# Patient Record
Sex: FEMALE | Race: WHITE | Employment: PART TIME | URBAN - METROPOLITAN AREA
[De-identification: names, ages, dates, MRNs, and addresses within clinical notes are randomized per-mention and may not be internally consistent; named-entity substitution may affect disease eponyms.]

---

## 2019-09-24 ENCOUNTER — HOSPITAL ENCOUNTER (EMERGENCY)
Age: 34
Discharge: HOME | End: 2019-09-24
Attending: EMERGENCY MEDICINE
Payer: COMMERCIAL

## 2019-09-24 ENCOUNTER — APPOINTMENT (OUTPATIENT)
Dept: GENERAL RADIOLOGY | Age: 34
End: 2019-09-24
Payer: COMMERCIAL

## 2019-09-24 VITALS
TEMPERATURE: 98.2 F | WEIGHT: 130 LBS | DIASTOLIC BLOOD PRESSURE: 85 MMHG | HEIGHT: 65 IN | BODY MASS INDEX: 21.66 KG/M2 | HEART RATE: 128 BPM | OXYGEN SATURATION: 99 % | RESPIRATION RATE: 20 BRPM | SYSTOLIC BLOOD PRESSURE: 128 MMHG

## 2019-09-24 DIAGNOSIS — M25.512 LEFT SHOULDER PAIN, UNSPECIFIED CHRONICITY: Primary | ICD-10-CM

## 2019-09-24 PROCEDURE — 6370000000 HC RX 637 (ALT 250 FOR IP): Performed by: EMERGENCY MEDICINE

## 2019-09-24 PROCEDURE — 99283 EMERGENCY DEPT VISIT LOW MDM: CPT

## 2019-09-24 PROCEDURE — 73030 X-RAY EXAM OF SHOULDER: CPT

## 2019-09-24 RX ORDER — ACETAMINOPHEN 325 MG/1
650 TABLET ORAL ONCE
Status: COMPLETED | OUTPATIENT
Start: 2019-09-24 | End: 2019-09-24

## 2019-09-24 RX ADMIN — ACETAMINOPHEN 650 MG: 325 TABLET ORAL at 01:19

## 2019-09-24 ASSESSMENT — PAIN SCALES - GENERAL
PAINLEVEL_OUTOF10: 7
PAINLEVEL_OUTOF10: 6

## 2019-09-24 ASSESSMENT — PAIN DESCRIPTION - ORIENTATION: ORIENTATION: LEFT

## 2019-09-24 ASSESSMENT — PAIN DESCRIPTION - LOCATION: LOCATION: SHOULDER

## 2019-09-24 ASSESSMENT — PAIN DESCRIPTION - PAIN TYPE: TYPE: ACUTE PAIN
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
## 2021-09-06 ENCOUNTER — HOSPITAL ENCOUNTER (EMERGENCY)
Age: 36
Discharge: HOME | End: 2021-09-06
Attending: STUDENT IN AN ORGANIZED HEALTH CARE EDUCATION/TRAINING PROGRAM
Payer: COMMERCIAL

## 2021-09-06 VITALS
HEIGHT: 65 IN | TEMPERATURE: 97.3 F | DIASTOLIC BLOOD PRESSURE: 99 MMHG | BODY MASS INDEX: 22.49 KG/M2 | RESPIRATION RATE: 18 BRPM | SYSTOLIC BLOOD PRESSURE: 162 MMHG | HEART RATE: 100 BPM | WEIGHT: 135 LBS | OXYGEN SATURATION: 98 %

## 2021-09-06 DIAGNOSIS — H66.90 ACUTE OTITIS MEDIA, UNSPECIFIED OTITIS MEDIA TYPE: ICD-10-CM

## 2021-09-06 DIAGNOSIS — J02.0 STREPTOCOCCAL SORE THROAT: Primary | ICD-10-CM

## 2021-09-06 LAB — STREP GRP A PCR: POSITIVE

## 2021-09-06 PROCEDURE — 6370000000 HC RX 637 (ALT 250 FOR IP): Performed by: PHYSICIAN ASSISTANT

## 2021-09-06 PROCEDURE — 87651 STREP A DNA AMP PROBE: CPT

## 2021-09-06 PROCEDURE — 99283 EMERGENCY DEPT VISIT LOW MDM: CPT

## 2021-09-06 RX ORDER — AZITHROMYCIN 500 MG/1
500 TABLET, FILM COATED ORAL DAILY
Qty: 5 TABLET | Refills: 0 | Status: SHIPPED | OUTPATIENT
Start: 2021-09-06 | End: 2021-09-06 | Stop reason: SDUPTHER

## 2021-09-06 RX ORDER — AZITHROMYCIN 500 MG/1
500 TABLET, FILM COATED ORAL DAILY
Qty: 5 TABLET | Refills: 0 | Status: SHIPPED | OUTPATIENT
Start: 2021-09-06 | End: 2021-09-11

## 2021-09-06 RX ORDER — AZITHROMYCIN 500 MG/1
500 TABLET, FILM COATED ORAL ONCE
Status: COMPLETED | OUTPATIENT
Start: 2021-09-06 | End: 2021-09-06

## 2021-09-06 RX ADMIN — AZITHROMYCIN MONOHYDRATE 500 MG: 500 TABLET ORAL at 09:16

## 2021-09-06 ASSESSMENT — ENCOUNTER SYMPTOMS
WHEEZING: 0
NAUSEA: 0
VOMITING: 0
CHEST TIGHTNESS: 0
CONSTIPATION: 0
SHORTNESS OF BREATH: 0
VOICE CHANGE: 0
TROUBLE SWALLOWING: 0
SINUS PRESSURE: 1
COUGH: 0
RHINORRHEA: 0
EYE DISCHARGE: 1
ABDOMINAL PAIN: 0
SORE THROAT: 1

## 2021-09-06 ASSESSMENT — PAIN DESCRIPTION - PROGRESSION: CLINICAL_PROGRESSION: GRADUALLY WORSENING

## 2021-09-06 ASSESSMENT — PAIN DESCRIPTION - DESCRIPTORS: DESCRIPTORS: ACHING

## 2021-09-06 ASSESSMENT — PAIN DESCRIPTION - FREQUENCY: FREQUENCY: INTERMITTENT

## 2021-09-06 ASSESSMENT — PAIN - FUNCTIONAL ASSESSMENT: PAIN_FUNCTIONAL_ASSESSMENT: ACTIVITIES ARE NOT PREVENTED

## 2021-09-06 ASSESSMENT — PAIN SCALES - GENERAL: PAINLEVEL_OUTOF10: 6

## 2021-09-06 ASSESSMENT — PAIN DESCRIPTION - PAIN TYPE: TYPE: ACUTE PAIN

## 2021-09-06 ASSESSMENT — PAIN DESCRIPTION - LOCATION: LOCATION: EAR

## 2021-09-06 ASSESSMENT — PAIN DESCRIPTION - ORIENTATION: ORIENTATION: LEFT;RIGHT

## 2021-09-06 ASSESSMENT — PAIN DESCRIPTION - ONSET: ONSET: ON-GOING

## 2021-09-06 ASSESSMENT — PAIN SCALES - WONG BAKER: WONGBAKER_NUMERICALRESPONSE: 2

## 2021-09-06 NOTE — ED PROVIDER NOTES
[facility] ED  eMERGENCY dEPARTMENT eNCOUnter      Pt Name: Stephanie Lopez  MRN: [id number]  [place] 1985  Date of evaluation: 9/6/2021  Provider: Conchita Rodrigues, [address]    Stephanie Lopez is a 39 y.o. female per chart review has no pertinent pmhx presents to the ED with c/o sudden onset, gradually worsening sore throat, congestion, and bilateral ear pain x4 days. No known sick contacts. Denies associated chest pain, shortness of breath, nausea/vomiting or decreased PO intake. REVIEW OF SYSTEMS       Review of Systems   Constitutional: Positive for fatigue. Negative for activity change, appetite change, chills, diaphoresis and fever. HENT: Positive for congestion, ear pain, sinus pressure and sore throat. Negative for ear discharge, postnasal drip, rhinorrhea, trouble swallowing and voice change. Eyes: Positive for discharge. Respiratory: Negative for cough, chest tightness, shortness of breath and wheezing. Cardiovascular: Negative for chest pain, palpitations and leg swelling. Gastrointestinal: Negative for abdominal pain, constipation, nausea and vomiting. Genitourinary: Negative for dysuria, frequency and urgency. Musculoskeletal: Negative for arthralgias and myalgias. Neurological: Negative for dizziness, syncope, weakness, light-headedness, numbness and headaches. All other systems reviewed and are negative. Except as noted above the remainder of the review of systems was reviewed and negative. PAST MEDICAL HISTORY   History reviewed. No pertinent past medical history. SURGICAL HISTORY     History reviewed. No pertinent surgical history. CURRENT MEDICATIONS       Previous Medications    PRENAT W/O A-FECBN-DSS-FA-DHA (CITRANATAL HARMONY) 28-1-250 MG CAPS    Take 1 tablet by mouth daily. ALLERGIES     Pcn [penicillins]    FAMILY HISTORY     History reviewed. No pertinent family history.        SOCIAL HISTORY canal normal.  No middle ear effusion. Tympanic membrane is not erythematous. Nose: Congestion and rhinorrhea present. Mouth/Throat:      Mouth: Mucous membranes are moist.      Pharynx: Oropharynx is clear. Uvula midline. Posterior oropharyngeal erythema present. No pharyngeal swelling, oropharyngeal exudate or uvula swelling. Comments: No pooling of secretions in the posterior oropharynx. Eyes:      Extraocular Movements: Extraocular movements intact. Pupils: Pupils are equal, round, and reactive to light. Cardiovascular:      Rate and Rhythm: Normal rate and regular rhythm. Pulses: Normal pulses. Heart sounds: Normal heart sounds. No murmur heard. Pulmonary:      Effort: Pulmonary effort is normal. No respiratory distress. Breath sounds: Normal breath sounds. No wheezing. Comments: Breathing comfortably on room air. Abdominal:      General: Abdomen is flat. Bowel sounds are normal. There is no distension. Palpations: Abdomen is soft. Tenderness: There is no abdominal tenderness. There is no guarding. Musculoskeletal:         General: No swelling, tenderness, deformity or signs of injury. Normal range of motion. Cervical back: Normal range of motion and neck supple. No rigidity. No muscular tenderness. Lymphadenopathy:      Cervical: No cervical adenopathy. Skin:     General: Skin is warm and dry. Capillary Refill: Capillary refill takes less than 2 seconds. Neurological:      General: No focal deficit present. Mental Status: She is alert and oriented to person, place, and time. Mental status is at baseline. Motor: No weakness. Gait: Gait normal.   Psychiatric:         Mood and Affect: Mood normal.         Behavior: Behavior normal.         Thought Content:  Thought content normal.         Judgment: Judgment normal.           LABS:  Labs Reviewed   RAPID STREP SCREEN - Abnormal; Notable for the following components:

## 2021-09-06 NOTE — ED TRIAGE NOTES
Pt reports that she is having bilat ear pain ans a sore throat for aprox 4 days pt alert rr even non labored speaking clear full sentences

## 2025-03-01 ENCOUNTER — HOSPITAL ENCOUNTER (EMERGENCY)
Age: 40
Discharge: HOME | DRG: 566 | End: 2025-03-01
Payer: COMMERCIAL

## 2025-03-01 VITALS
OXYGEN SATURATION: 100 % | DIASTOLIC BLOOD PRESSURE: 96 MMHG | SYSTOLIC BLOOD PRESSURE: 134 MMHG | BODY MASS INDEX: 24.76 KG/M2 | HEART RATE: 71 BPM | WEIGHT: 148.6 LBS | TEMPERATURE: 98.3 F | RESPIRATION RATE: 20 BRPM | HEIGHT: 65 IN

## 2025-03-01 DIAGNOSIS — Z34.90 PREGNANCY, UNSPECIFIED GESTATIONAL AGE: Primary | ICD-10-CM

## 2025-03-01 DIAGNOSIS — I10 ESSENTIAL HYPERTENSION: ICD-10-CM

## 2025-03-01 LAB
ALBUMIN SERPL-MCNC: 4.4 G/DL (ref 3.5–4.6)
ALP SERPL-CCNC: 62 U/L (ref 40–130)
ALT SERPL-CCNC: 13 U/L (ref 0–33)
ANION GAP SERPL CALCULATED.3IONS-SCNC: 12 MEQ/L (ref 9–15)
AST SERPL-CCNC: 13 U/L (ref 0–35)
BASOPHILS # BLD: 0 K/UL (ref 0–0.2)
BASOPHILS NFR BLD: 0.3 %
BILIRUB SERPL-MCNC: 0.9 MG/DL (ref 0.2–0.7)
BILIRUB UR QL STRIP: NEGATIVE
BUN SERPL-MCNC: 11 MG/DL (ref 6–20)
CALCIUM SERPL-MCNC: 9.7 MG/DL (ref 8.5–9.9)
CHLORIDE SERPL-SCNC: 98 MEQ/L (ref 95–107)
CHP ED QC CHECK: NORMAL
CLARITY UR: CLEAR
CO2 SERPL-SCNC: 24 MEQ/L (ref 20–31)
COLOR UR: YELLOW
CREAT SERPL-MCNC: 0.68 MG/DL (ref 0.5–0.9)
EOSINOPHIL # BLD: 0.1 K/UL (ref 0–0.7)
EOSINOPHIL NFR BLD: 0.9 %
ERYTHROCYTE [DISTWIDTH] IN BLOOD BY AUTOMATED COUNT: 14.4 % (ref 11.5–14.5)
GLOBULIN SER CALC-MCNC: 3.1 G/DL (ref 2.3–3.5)
GLUCOSE BLD-MCNC: 97 MG/DL
GLUCOSE BLD-MCNC: 97 MG/DL (ref 70–99)
GLUCOSE SERPL-MCNC: 93 MG/DL (ref 70–99)
GLUCOSE UR STRIP-MCNC: NEGATIVE MG/DL
HCG, URINE, POC: POSITIVE
HCT VFR BLD AUTO: 39 % (ref 37–47)
HGB BLD-MCNC: 14.1 G/DL (ref 12–16)
HGB UR QL STRIP: NEGATIVE
INFLUENZA A BY PCR: NEGATIVE
INFLUENZA B BY PCR: NEGATIVE
KETONES UR STRIP-MCNC: ABNORMAL MG/DL
LEUKOCYTE ESTERASE UR QL STRIP: NEGATIVE
LYMPHOCYTES # BLD: 3.1 K/UL (ref 1–4.8)
LYMPHOCYTES NFR BLD: 24.8 %
Lab: NORMAL
MCH RBC QN AUTO: 33.8 PG (ref 27–31.3)
MCHC RBC AUTO-ENTMCNC: 36.2 % (ref 33–37)
MCV RBC AUTO: 93.5 FL (ref 79.4–94.8)
MONOCYTES # BLD: 1 K/UL (ref 0.2–0.8)
MONOCYTES NFR BLD: 7.7 %
NEGATIVE QC PASS/FAIL: NORMAL
NEUTROPHILS # BLD: 8.2 K/UL (ref 1.4–6.5)
NEUTS SEG NFR BLD: 65.9 %
NITRITE UR QL STRIP: NEGATIVE
PERFORMED ON: NORMAL
PH UR STRIP: 5.5 [PH] (ref 5–9)
PLATELET # BLD AUTO: 402 K/UL (ref 130–400)
POSITIVE QC PASS/FAIL: NORMAL
POTASSIUM SERPL-SCNC: 4 MEQ/L (ref 3.4–4.9)
PROT SERPL-MCNC: 7.5 G/DL (ref 6.3–8)
PROT UR STRIP-MCNC: NEGATIVE MG/DL
RBC # BLD AUTO: 4.17 M/UL (ref 4.2–5.4)
SARS-COV-2 RDRP RESP QL NAA+PROBE: NOT DETECTED
SODIUM SERPL-SCNC: 134 MEQ/L (ref 135–144)
SP GR UR STRIP: 1.03 (ref 1–1.03)
URINE REFLEX TO CULTURE: ABNORMAL
UROBILINOGEN UR STRIP-ACNC: 0.2 E.U./DL
WBC # BLD AUTO: 12.5 K/UL (ref 4.8–10.8)

## 2025-03-01 PROCEDURE — 85025 COMPLETE CBC W/AUTO DIFF WBC: CPT

## 2025-03-01 PROCEDURE — 87502 INFLUENZA DNA AMP PROBE: CPT

## 2025-03-01 PROCEDURE — 81003 URINALYSIS AUTO W/O SCOPE: CPT

## 2025-03-01 PROCEDURE — 99283 EMERGENCY DEPT VISIT LOW MDM: CPT

## 2025-03-01 PROCEDURE — 87635 SARS-COV-2 COVID-19 AMP PRB: CPT

## 2025-03-01 PROCEDURE — 80053 COMPREHEN METABOLIC PANEL: CPT

## 2025-03-01 PROCEDURE — 36415 COLL VENOUS BLD VENIPUNCTURE: CPT

## 2025-03-01 ASSESSMENT — ENCOUNTER SYMPTOMS
COLOR CHANGE: 0
SORE THROAT: 0
DIARRHEA: 0
ABDOMINAL DISTENTION: 0
ABDOMINAL PAIN: 0
EYE DISCHARGE: 0
RHINORRHEA: 0
CONSTIPATION: 0
SHORTNESS OF BREATH: 0
RECTAL PAIN: 0

## 2025-03-01 ASSESSMENT — PAIN - FUNCTIONAL ASSESSMENT: PAIN_FUNCTIONAL_ASSESSMENT: NONE - DENIES PAIN

## 2025-03-01 ASSESSMENT — LIFESTYLE VARIABLES
HOW OFTEN DO YOU HAVE A DRINK CONTAINING ALCOHOL: NEVER
HOW MANY STANDARD DRINKS CONTAINING ALCOHOL DO YOU HAVE ON A TYPICAL DAY: PATIENT DOES NOT DRINK

## 2025-03-01 NOTE — ED PROVIDER NOTES
[facility] EMERGENCY DEPARTMENT  EMERGENCY DEPARTMENT ENCOUNTER      Pt Name: Emanuel Tamayo  MRN: [id number]  Birthdate 1985  Date of evaluation: 3/1/2025  Provider: Freddy Orourke PA-C  5:28 PM EST    CHIEF COMPLAINT       Chief Complaint   Patient presents with    Lightheadedness         HISTORY OF PRESENT ILLNESS   (Location/Symptom, Timing/Onset, Context/Setting, Quality, Duration, Modifying Factors, Severity)  Note limiting factors.   Emanuel Tamayo is a 40 y.o. female who presents to the emergency department with weight of lightheaded and dizziness which patient states been ongoing for approximate last 3 to 4 days for her, patient states no nausea vomit, no diarrhea, no urinary complaints, no fever, no cough, no shortness of breath, she denies any recent ill contacts, patient states she has not had a menstrual period in approximately last 2 months, she is concerned for pregnancy.  Patient states that she has had periods of irregular menses in the past, without definable cause.  Patient denies any vaginal discharge or bleeding.  No past medical history per patient or chart review.    HPI    Nursing Notes were reviewed.    REVIEW OF SYSTEMS    (2-9 systems for level 4, 10 or more for level 5)     Review of Systems   Constitutional:  Negative for activity change and appetite change.   HENT:  Negative for congestion, ear discharge, ear pain, nosebleeds, rhinorrhea and sore throat.    Eyes:  Negative for discharge.   Respiratory:  Negative for shortness of breath.    Cardiovascular:  Negative for chest pain, palpitations and leg swelling.   Gastrointestinal:  Negative for abdominal distention, abdominal pain, constipation, diarrhea and rectal pain.   Genitourinary:  Negative for difficulty urinating and dysuria.   Musculoskeletal:  Negative for arthralgias.   Skin:  Negative for color change.   Neurological:  Positive for dizziness and light-headedness. Negative for tremors, syncope, weakness, numbness  images are visualized and preliminarily interpreted by the emergency physician with the below findings:         Interpretation per the Radiologist below, if available at the time of this note:    No orders to display         ED BEDSIDE ULTRASOUND:   Performed by ED Physician - none    LABS:  Labs Reviewed   CBC WITH AUTO DIFFERENTIAL - Abnormal; Notable for the following components:       Result Value    WBC 12.5 (*)     RBC 4.17 (*)     MCH 33.8 (*)     Platelets 402 (*)     Neutrophils Absolute 8.2 (*)     Monocytes Absolute 1.0 (*)     All other components within normal limits   COMPREHENSIVE METABOLIC PANEL - Abnormal; Notable for the following components:    Sodium 134 (*)     Total Bilirubin 0.9 (*)     All other components within normal limits   URINALYSIS WITH REFLEX TO CULTURE - Abnormal; Notable for the following components:    Ketones, Urine TRACE (*)     All other components within normal limits   POCT GLUCOSE - Normal   COVID-19, RAPID   RAPID INFLUENZA A/B ANTIGENS   POC PREGNANCY UR-QUAL   POCT GLUCOSE       All other labs were within normal range or not returned as of this dictation.    EMERGENCY DEPARTMENT COURSE and DIFFERENTIAL DIAGNOSIS/MDM:   Vitals:    Vitals:    03/01/25 1738 03/01/25 1740 03/01/25 1741 03/01/25 1945   BP: (!) 148/87 (!) 153/93 (!) 155/91 (!) 134/96   Pulse:       Resp:       Temp:       SpO2:    100%   Weight:       Height:                Medical Decision Making  Accepted care from Stewart Workman.  Patient noted to be pregnant.  I reevaluated patient she denies any abdominal pain denies any known history of hypertension.  Repeating her blood pressure improved.  Reviewed labs and have no comparisons I discussed with Lien Saba from OB/GYN she recommends follow-up with OB/GYN no medications currently as patient's blood pressure 134/96 mmHg.  She will contact patient about an appointment Monday or Tuesday from OB/GYN discussed plan of care with patient she is agreeable plan  of care wants disposition home.  Will provide work note.    Amount and/or Complexity of Data Reviewed  Labs: ordered. Decision-making details documented in ED Course.            REASSESSMENT     ED Course as of 03/01/25 2005   Sat Mar 01, 2025   1809 CMP [GR]   1809 COVID-19, Rapid [GR]   1809 Rapid Influenza A/B Antigens [GR]   1831 Rapid Influenza A/B Antigens [GR]   1831 COVID-19, Rapid [GR]   1831 CMP [GR]   1854 SARS-CoV-2, NAAT: Not Detected [GR]   1854 Sodium(!): 134 [GR]   1854 Protein, UA: Negative [GR]   1854 WBC(!): 12.5 [GR]   1854 Hemoglobin Quant: 14.1 [GR]   1854 Hematocrit: 39.0 [GR]   1918 Influenza A by PCR: Negative [GR]   1918 Influenza B by PCR: Negative [GR]      ED Course User Index  [GR] Freddy Orourke PA-C         CRITICAL CARE TIME   None    CONSULTS:  None    PROCEDURES:  Unless otherwise noted below, none     Procedures      FINAL IMPRESSION      1. Pregnancy, unspecified gestational age    2. Essential hypertension          DISPOSITION/PLAN   DISPOSITION Decision To Discharge 03/01/2025 08:01:38 PM      PATIENT REFERRED TO:  Lien Gonzalez APRN - [place]  [address] [facility] [id number] [phone]    Call in 1 day      Yovani Tanner PA  [address] [facility] [address]  [phone]    Call in 1 day      [facility]  [address] [facility] [address]  [phone]  Go to   If symptoms worsen      DISCHARGE MEDICATIONS:  New Prescriptions    No medications on file     Controlled Substances Monitoring:          No data to display                (Please note that portions of this note were completed with a voice recognition program.  Efforts were made to edit the dictations but occasionally words are mis-transcribed.)    Freddy Orourke PA-C (electronically signed)  Attending Emergency Physician    Supervising Attending Physician: Dr. Sims.      Freddy Orourke PA-C  03/01/25 2005

## 2025-03-01 NOTE — ED NOTES
The patient, Stewart BARBOUR and Freddy BARBOUR were made aware that the patient is positive for pregnancy.

## 2025-03-02 NOTE — DISCHARGE INSTRUCTIONS
Monitor blood pressure twice daily follow-up with OB/GYN.  They will call Monday or Tuesday for appointment Monday or Tuesday.  Return to if any symptoms worsen or new symptoms develop.

## 2025-03-02 NOTE — ED NOTES
Pt asked about any history of high BP  Per pt, no hx of hypertension, but also stated she does not \"follow up with anyone to know\"

## 2025-03-03 ENCOUNTER — TELEPHONE (OUTPATIENT)
Dept: OBGYN CLINIC | Age: 40
End: 2025-03-03

## 2025-03-04 ENCOUNTER — APPOINTMENT (OUTPATIENT)
Dept: GENERAL RADIOLOGY | Age: 40
DRG: 566 | End: 2025-03-04
Payer: COMMERCIAL

## 2025-03-04 ENCOUNTER — HOSPITAL ENCOUNTER (INPATIENT)
Age: 40
LOS: 1 days | Discharge: HOME | DRG: 566 | End: 2025-03-05
Attending: STUDENT IN AN ORGANIZED HEALTH CARE EDUCATION/TRAINING PROGRAM | Admitting: INTERNAL MEDICINE
Payer: COMMERCIAL

## 2025-03-04 DIAGNOSIS — K92.0 GASTROINTESTINAL HEMORRHAGE WITH HEMATEMESIS: Primary | ICD-10-CM

## 2025-03-04 DIAGNOSIS — F10.29 ALCOHOL DEPENDENCE WITH UNSPECIFIED ALCOHOL-INDUCED DISORDER (HCC): ICD-10-CM

## 2025-03-04 LAB
ABO/RH: NORMAL
ALBUMIN SERPL-MCNC: 4.3 G/DL (ref 3.5–4.6)
ALP SERPL-CCNC: 60 U/L (ref 40–130)
ALT SERPL-CCNC: 11 U/L (ref 0–33)
ANION GAP SERPL CALCULATED.3IONS-SCNC: 8 MEQ/L (ref 9–15)
ANTIBODY SCREEN: NORMAL
APTT PPP: 24.7 SEC (ref 24.4–36.8)
AST SERPL-CCNC: 12 U/L (ref 0–35)
BASOPHILS # BLD: 0 K/UL (ref 0–0.2)
BASOPHILS NFR BLD: 0.3 %
BILIRUB SERPL-MCNC: <0.2 MG/DL (ref 0.2–0.7)
BILIRUB UR QL STRIP: NEGATIVE
BUN SERPL-MCNC: 13 MG/DL (ref 6–20)
CALCIUM SERPL-MCNC: 9.8 MG/DL (ref 8.5–9.9)
CHLORIDE SERPL-SCNC: 102 MEQ/L (ref 95–107)
CLARITY UR: ABNORMAL
CO2 SERPL-SCNC: 25 MEQ/L (ref 20–31)
COLOR UR: YELLOW
CREAT SERPL-MCNC: 0.69 MG/DL (ref 0.5–0.9)
EOSINOPHIL # BLD: 0.1 K/UL (ref 0–0.7)
EOSINOPHIL NFR BLD: 0.5 %
ERYTHROCYTE [DISTWIDTH] IN BLOOD BY AUTOMATED COUNT: 14.3 % (ref 11.5–14.5)
ETHANOL PERCENT: NORMAL G/DL
ETHANOLAMINE SERPL-MCNC: <10 MG/DL (ref 0–0.08)
GLOBULIN SER CALC-MCNC: 2.3 G/DL (ref 2.3–3.5)
GLUCOSE SERPL-MCNC: 107 MG/DL (ref 70–99)
GLUCOSE UR STRIP-MCNC: NEGATIVE MG/DL
GONADOTROPIN, CHORIONIC (HCG) QUANT: NORMAL MIU/ML
HCT VFR BLD AUTO: 33.5 % (ref 37–47)
HCT VFR BLD AUTO: 37.3 % (ref 37–47)
HGB BLD-MCNC: 11.6 G/DL (ref 12–16)
HGB BLD-MCNC: 13.2 G/DL (ref 12–16)
HGB UR QL STRIP: NEGATIVE
INR PPP: 0.9
KETONES UR STRIP-MCNC: ABNORMAL MG/DL
LACTATE BLDV-SCNC: 1.2 MMOL/L (ref 0.5–2.2)
LEUKOCYTE ESTERASE UR QL STRIP: NEGATIVE
LIPASE SERPL-CCNC: 44 U/L (ref 12–95)
LYMPHOCYTES # BLD: 1.6 K/UL (ref 1–4.8)
LYMPHOCYTES NFR BLD: 9.8 %
MAGNESIUM SERPL-MCNC: 1.9 MG/DL (ref 1.7–2.4)
MCH RBC QN AUTO: 33 PG (ref 27–31.3)
MCHC RBC AUTO-ENTMCNC: 35.4 % (ref 33–37)
MCV RBC AUTO: 93.3 FL (ref 79.4–94.8)
MONOCYTES # BLD: 0.8 K/UL (ref 0.2–0.8)
MONOCYTES NFR BLD: 5.1 %
NEUTROPHILS # BLD: 13.4 K/UL (ref 1.4–6.5)
NEUTS SEG NFR BLD: 83.9 %
NITRITE UR QL STRIP: NEGATIVE
PH UR STRIP: 6 [PH] (ref 5–9)
PLATELET # BLD AUTO: 401 K/UL (ref 130–400)
POTASSIUM SERPL-SCNC: 4.1 MEQ/L (ref 3.4–4.9)
PROT SERPL-MCNC: 6.6 G/DL (ref 6.3–8)
PROT UR STRIP-MCNC: ABNORMAL MG/DL
PROTHROMBIN TIME: 12.1 SEC (ref 12.3–14.9)
RBC # BLD AUTO: 4 M/UL (ref 4.2–5.4)
SODIUM SERPL-SCNC: 135 MEQ/L (ref 135–144)
SP GR UR STRIP: 1.03 (ref 1–1.03)
URINE REFLEX TO CULTURE: ABNORMAL
UROBILINOGEN UR STRIP-ACNC: 0.2 E.U./DL
WBC # BLD AUTO: 16 K/UL (ref 4.8–10.8)

## 2025-03-04 PROCEDURE — 86901 BLOOD TYPING SEROLOGIC RH(D): CPT

## 2025-03-04 PROCEDURE — 83690 ASSAY OF LIPASE: CPT

## 2025-03-04 PROCEDURE — 71045 X-RAY EXAM CHEST 1 VIEW: CPT

## 2025-03-04 PROCEDURE — 86850 RBC ANTIBODY SCREEN: CPT

## 2025-03-04 PROCEDURE — 85014 HEMATOCRIT: CPT

## 2025-03-04 PROCEDURE — 82077 ASSAY SPEC XCP UR&BREATH IA: CPT

## 2025-03-04 PROCEDURE — 96361 HYDRATE IV INFUSION ADD-ON: CPT

## 2025-03-04 PROCEDURE — 83605 ASSAY OF LACTIC ACID: CPT

## 2025-03-04 PROCEDURE — 2500000003 HC RX 250 WO HCPCS: Performed by: INTERNAL MEDICINE

## 2025-03-04 PROCEDURE — 93005 ELECTROCARDIOGRAM TRACING: CPT | Performed by: STUDENT IN AN ORGANIZED HEALTH CARE EDUCATION/TRAINING PROGRAM

## 2025-03-04 PROCEDURE — 80053 COMPREHEN METABOLIC PANEL: CPT

## 2025-03-04 PROCEDURE — 96376 TX/PRO/DX INJ SAME DRUG ADON: CPT

## 2025-03-04 PROCEDURE — 99285 EMERGENCY DEPT VISIT HI MDM: CPT

## 2025-03-04 PROCEDURE — 6370000000 HC RX 637 (ALT 250 FOR IP): Performed by: INTERNAL MEDICINE

## 2025-03-04 PROCEDURE — 83735 ASSAY OF MAGNESIUM: CPT

## 2025-03-04 PROCEDURE — 2580000003 HC RX 258: Performed by: INTERNAL MEDICINE

## 2025-03-04 PROCEDURE — 85730 THROMBOPLASTIN TIME PARTIAL: CPT

## 2025-03-04 PROCEDURE — 96375 TX/PRO/DX INJ NEW DRUG ADDON: CPT

## 2025-03-04 PROCEDURE — 2580000003 HC RX 258: Performed by: STUDENT IN AN ORGANIZED HEALTH CARE EDUCATION/TRAINING PROGRAM

## 2025-03-04 PROCEDURE — 85610 PROTHROMBIN TIME: CPT

## 2025-03-04 PROCEDURE — 6360000002 HC RX W HCPCS: Performed by: INTERNAL MEDICINE

## 2025-03-04 PROCEDURE — G0378 HOSPITAL OBSERVATION PER HR: HCPCS

## 2025-03-04 PROCEDURE — 85025 COMPLETE CBC W/AUTO DIFF WBC: CPT

## 2025-03-04 PROCEDURE — 99223 1ST HOSP IP/OBS HIGH 75: CPT | Performed by: INTERNAL MEDICINE

## 2025-03-04 PROCEDURE — 86900 BLOOD TYPING SEROLOGIC ABO: CPT

## 2025-03-04 PROCEDURE — 6360000002 HC RX W HCPCS: Performed by: STUDENT IN AN ORGANIZED HEALTH CARE EDUCATION/TRAINING PROGRAM

## 2025-03-04 PROCEDURE — 2500000003 HC RX 250 WO HCPCS: Performed by: STUDENT IN AN ORGANIZED HEALTH CARE EDUCATION/TRAINING PROGRAM

## 2025-03-04 PROCEDURE — 96365 THER/PROPH/DIAG IV INF INIT: CPT

## 2025-03-04 PROCEDURE — 36415 COLL VENOUS BLD VENIPUNCTURE: CPT

## 2025-03-04 PROCEDURE — 1210000000 HC MED SURG R&B

## 2025-03-04 PROCEDURE — 81003 URINALYSIS AUTO W/O SCOPE: CPT

## 2025-03-04 PROCEDURE — 85018 HEMOGLOBIN: CPT

## 2025-03-04 PROCEDURE — 84702 CHORIONIC GONADOTROPIN TEST: CPT

## 2025-03-04 RX ORDER — THIAMINE HYDROCHLORIDE 100 MG/ML
100 INJECTION, SOLUTION INTRAMUSCULAR; INTRAVENOUS ONCE
Status: COMPLETED | OUTPATIENT
Start: 2025-03-04 | End: 2025-03-04

## 2025-03-04 RX ORDER — LORAZEPAM 2 MG/ML
4 INJECTION INTRAMUSCULAR
Status: DISCONTINUED | OUTPATIENT
Start: 2025-03-04 | End: 2025-03-05 | Stop reason: HOSPADM

## 2025-03-04 RX ORDER — SODIUM CHLORIDE 0.9 % (FLUSH) 0.9 %
5-40 SYRINGE (ML) INJECTION PRN
Status: DISCONTINUED | OUTPATIENT
Start: 2025-03-04 | End: 2025-03-05 | Stop reason: HOSPADM

## 2025-03-04 RX ORDER — SODIUM CHLORIDE 9 MG/ML
INJECTION, SOLUTION INTRAVENOUS PRN
Status: DISCONTINUED | OUTPATIENT
Start: 2025-03-04 | End: 2025-03-05 | Stop reason: HOSPADM

## 2025-03-04 RX ORDER — ONDANSETRON 2 MG/ML
4 INJECTION INTRAMUSCULAR; INTRAVENOUS ONCE
Status: COMPLETED | OUTPATIENT
Start: 2025-03-04 | End: 2025-03-04

## 2025-03-04 RX ORDER — SODIUM CHLORIDE 9 MG/ML
INJECTION, SOLUTION INTRAVENOUS CONTINUOUS
Status: DISPENSED | OUTPATIENT
Start: 2025-03-04 | End: 2025-03-04

## 2025-03-04 RX ORDER — LORAZEPAM 1 MG/1
1 TABLET ORAL
Status: DISCONTINUED | OUTPATIENT
Start: 2025-03-04 | End: 2025-03-05 | Stop reason: HOSPADM

## 2025-03-04 RX ORDER — SODIUM CHLORIDE 0.9 % (FLUSH) 0.9 %
5-40 SYRINGE (ML) INJECTION EVERY 12 HOURS SCHEDULED
Status: DISCONTINUED | OUTPATIENT
Start: 2025-03-04 | End: 2025-03-05 | Stop reason: HOSPADM

## 2025-03-04 RX ORDER — ACETAMINOPHEN 325 MG/1
650 TABLET ORAL EVERY 6 HOURS PRN
Status: DISCONTINUED | OUTPATIENT
Start: 2025-03-04 | End: 2025-03-05 | Stop reason: HOSPADM

## 2025-03-04 RX ORDER — LORAZEPAM 1 MG/1
2 TABLET ORAL
Status: DISCONTINUED | OUTPATIENT
Start: 2025-03-04 | End: 2025-03-05 | Stop reason: HOSPADM

## 2025-03-04 RX ORDER — LORAZEPAM 2 MG/ML
3 INJECTION INTRAMUSCULAR
Status: DISCONTINUED | OUTPATIENT
Start: 2025-03-04 | End: 2025-03-05 | Stop reason: HOSPADM

## 2025-03-04 RX ORDER — LORAZEPAM 1 MG/1
4 TABLET ORAL
Status: DISCONTINUED | OUTPATIENT
Start: 2025-03-04 | End: 2025-03-05 | Stop reason: HOSPADM

## 2025-03-04 RX ORDER — LORAZEPAM 1 MG/1
3 TABLET ORAL
Status: DISCONTINUED | OUTPATIENT
Start: 2025-03-04 | End: 2025-03-05 | Stop reason: HOSPADM

## 2025-03-04 RX ORDER — ONDANSETRON 4 MG/1
4 TABLET, ORALLY DISINTEGRATING ORAL EVERY 8 HOURS PRN
Status: DISCONTINUED | OUTPATIENT
Start: 2025-03-04 | End: 2025-03-05 | Stop reason: HOSPADM

## 2025-03-04 RX ORDER — ONDANSETRON 2 MG/ML
4 INJECTION INTRAMUSCULAR; INTRAVENOUS EVERY 6 HOURS PRN
Status: DISCONTINUED | OUTPATIENT
Start: 2025-03-04 | End: 2025-03-05 | Stop reason: HOSPADM

## 2025-03-04 RX ORDER — 0.9 % SODIUM CHLORIDE 0.9 %
1000 INTRAVENOUS SOLUTION INTRAVENOUS ONCE
Status: COMPLETED | OUTPATIENT
Start: 2025-03-04 | End: 2025-03-04

## 2025-03-04 RX ORDER — ACETAMINOPHEN 650 MG/1
650 SUPPOSITORY RECTAL EVERY 6 HOURS PRN
Status: DISCONTINUED | OUTPATIENT
Start: 2025-03-04 | End: 2025-03-05 | Stop reason: HOSPADM

## 2025-03-04 RX ORDER — LORAZEPAM 2 MG/ML
1 INJECTION INTRAMUSCULAR
Status: DISCONTINUED | OUTPATIENT
Start: 2025-03-04 | End: 2025-03-05 | Stop reason: HOSPADM

## 2025-03-04 RX ORDER — LORAZEPAM 2 MG/ML
2 INJECTION INTRAMUSCULAR
Status: DISCONTINUED | OUTPATIENT
Start: 2025-03-04 | End: 2025-03-05 | Stop reason: HOSPADM

## 2025-03-04 RX ADMIN — SODIUM CHLORIDE 1000 ML: 0.9 INJECTION, SOLUTION INTRAVENOUS at 08:56

## 2025-03-04 RX ADMIN — SODIUM CHLORIDE 40 MG: 9 INJECTION, SOLUTION INTRAMUSCULAR; INTRAVENOUS; SUBCUTANEOUS at 11:23

## 2025-03-04 RX ADMIN — SODIUM CHLORIDE, PRESERVATIVE FREE 10 ML: 5 INJECTION INTRAVENOUS at 20:29

## 2025-03-04 RX ADMIN — SODIUM CHLORIDE: 0.9 INJECTION, SOLUTION INTRAVENOUS at 11:33

## 2025-03-04 RX ADMIN — FAMOTIDINE 20 MG: 10 INJECTION, SOLUTION INTRAVENOUS at 08:51

## 2025-03-04 RX ADMIN — FOLIC ACID 1 MG: 5 INJECTION, SOLUTION INTRAMUSCULAR; INTRAVENOUS; SUBCUTANEOUS at 09:26

## 2025-03-04 RX ADMIN — SODIUM CHLORIDE 40 MG: 9 INJECTION, SOLUTION INTRAMUSCULAR; INTRAVENOUS; SUBCUTANEOUS at 20:28

## 2025-03-04 RX ADMIN — THIAMINE HYDROCHLORIDE 100 MG: 100 INJECTION, SOLUTION INTRAMUSCULAR; INTRAVENOUS at 08:54

## 2025-03-04 RX ADMIN — SODIUM CHLORIDE, PRESERVATIVE FREE 10 ML: 5 INJECTION INTRAVENOUS at 20:28

## 2025-03-04 RX ADMIN — ONDANSETRON 4 MG: 2 INJECTION, SOLUTION INTRAMUSCULAR; INTRAVENOUS at 08:48

## 2025-03-04 RX ADMIN — ONDANSETRON 4 MG: 4 TABLET, ORALLY DISINTEGRATING ORAL at 20:29

## 2025-03-04 RX ADMIN — WATER 1000 MG: 1 INJECTION INTRAMUSCULAR; INTRAVENOUS; SUBCUTANEOUS at 10:10

## 2025-03-04 ASSESSMENT — LIFESTYLE VARIABLES
HOW OFTEN DO YOU HAVE A DRINK CONTAINING ALCOHOL: 2-3 TIMES A WEEK
HOW MANY STANDARD DRINKS CONTAINING ALCOHOL DO YOU HAVE ON A TYPICAL DAY: 3 OR 4

## 2025-03-04 ASSESSMENT — PAIN - FUNCTIONAL ASSESSMENT: PAIN_FUNCTIONAL_ASSESSMENT: NONE - DENIES PAIN

## 2025-03-04 NOTE — H&P
HISTORY AND PHYSICAL             Date: 3/4/2025        Patient Name: Emanuel Tamayo     YOB: 1985      Age:  40 y.o.    Chief Complaint     Chief Complaint   Patient presents with    Hematemesis          History Obtained From   patient, electronic medical record    History of Present Illness   Patient is a 40-year-old female who presents to ED for complaints of hematemesis.  Patient reports nausea and vomiting with large amount of emesis with blood that occurred around 5 AM.  Patient confirms being pregnant, approximately 10 weeks.  Patient plans to terminate pregnancy.  She reports daily alcohol use endorses drinking 5-7 drinks daily of liquor.  Denies any abdominal pain, abdominal cramping, vaginal bleeding, dysuria ,chest pain, palpitations, headache, dizziness, shortness of breath, cough, fever, chills, and changes in appetite.  Hg 13.2 hematocrit 37.3 WBCs 16.0, PT 12.1      Past Medical History   No past medical history on file.     Past Surgical History   No past surgical history on file.     Medications Prior to Admission     Prior to Admission medications    Medication Sig Start Date End Date Taking? Authorizing Provider   Prenjackelyn w/o A-FeCbn-DSS-FA-DHA (CITRANATAL HARMONY) 28-1-250 MG CAPS Take 1 tablet by mouth daily. 8/9/12   Amy Bernard MD        Allergies   Pcn [penicillins]    Social History     Social History       Tobacco History       Smoking Status  Never      Smokeless Tobacco Use  Never              Alcohol History       Alcohol Use Status  Never              Drug Use       Drug Use Status  Never              Sexual Activity       Sexually Active  Not Asked                    Family History   No family history on file.    Review of Systems   12 point review of systems reviewed with patient; negative other than as mentioned    Physical Exam   /81   Pulse 87   Temp 97.9 °F (36.6 °C) (Oral)   Resp 16   Ht 1.651 m (5' 5\")   Wt 74.8 kg (164 lb 14.4 oz)   SpO2 100%    Urine TRACE (A) Negative mg/dL    Specific Gravity, UA 1.029 1.005 - 1.030    Blood, Urine Negative Negative    pH, Urine 6.0 5.0 - 9.0    Protein, UA TRACE (A) Negative mg/dL    Urobilinogen, Urine 0.2 <2.0 E.U./dL    Nitrite, Urine Negative Negative    Leukocyte Esterase, Urine Negative Negative    Urine Reflex to Culture Not Indicated         Imaging/Diagnostics Last 24 Hours   XR CHEST PORTABLE    Result Date: 3/4/2025  EXAMINATION: ONE XRAY VIEW OF THE CHEST 3/4/2025 8:40 am COMPARISON: None. HISTORY: ORDERING SYSTEM PROVIDED HISTORY: hematemesis TECHNOLOGIST PROVIDED HISTORY: Reason for exam:->hematemesis Is the patient pregnant?->Yes What reading provider will be dictating this exam?->CRC FINDINGS: No consolidation.  No pleural effusion.  No pneumothorax.  Normal cardiomediastinal silhouette.  No acute osseous findings.     No acute radiographic abnormality.       Assessment      Hospital Problems             Last Modified POA    * (Principal) Hematemesis 3/4/2025 Yes       Plan   *Hematemesis  -Telemetry  -N.p.o.  -IV Protonix 40 mg every 12 hours IVF 0.9 normal saline at 125 continuous  -CBC daily  -Consult GI  Pneumonic compression device    Pregnancy:  -patient has appointment Friday 3/7 to terminate pregnancy    Additional work up or/and treatment plan may be added today or then after based on clinical progression by other providers or specialists.  Patient will need to follow-up with PCP for chronic disease management.     I have spent greater than 70% of time  spent focused exclusively on this patient ,reviewing  chart, labs/diagnostics, reconciling medications, &  answering questions with patient and discussing plan.  Consultations Ordered:  IP CONSULT TO GI  IP CONSULT TO GI    Electronically signed by TAMARA Klein CNP on 3/4/25 at 1:27 PM EST

## 2025-03-04 NOTE — CARE COORDINATION
Case Management Assessment  Initial Evaluation    Date/Time of Evaluation: 3/4/2025 4:43 PM  Assessment Completed by: Maya Anderson    If patient is discharged prior to next notation, then this note serves as note for discharge by case management.    Patient Name: Emanuel Tamayo                   YOB: 1985  Diagnosis: Hematemesis [K92.0]  Gastrointestinal hemorrhage with hematemesis [K92.0]  Alcohol dependence with unspecified alcohol-induced disorder (HCC) [F10.29]                   Date / Time: 3/4/2025  8:07 AM    Patient Admission Status: Inpatient   Readmission Risk (Low < 19, Mod (19-27), High > 27): Readmission Risk Score: 4.5    Current PCP: None, . (Inactive)  PCP verified by CM? No (CM WILL MAKE SURE SHES ESTABLISHED AT [place])    Chart Reviewed: Yes      History Provided by: Patient  Patient Orientation: Alert and Oriented, Person, Place, Situation, Self    Patient Cognition: Alert    Hospitalization in the last 30 days (Readmission):  No    If yes, Readmission Assessment in CM Navigator will be completed.    Advance Directives:      Code Status: Full Code   Patient's Primary Decision Maker is: Named in Scanned ACP Document    Primary Decision Maker: Robbin Tamayo - Other - [phone]    Discharge Planning:    Patient lives with: Children, Spouse/Significant Other Type of Home: House  Primary Care Giver: Self  Patient Support Systems include: Spouse/Significant Other, Children   Current Financial resources:    Current community resources:    Current services prior to admission: None            Current DME:              Type of Home Care services:  None    ADLS  Prior functional level: Independent in ADLs/IADLs  Current functional level: Independent in ADLs/IADLs    PT AM-PAC:   /24  OT AM-PAC:   /24    Family can provide assistance at [place]: Yes  Would you like Case Management to discuss the discharge plan with any other family members/significant others, and if so, who? Yes  Plans to Return to

## 2025-03-04 NOTE — CONSULTS
Consult to Gastroenterology  Consult performed by: Thom Michelle MD  Consult ordered by: Bernardino Dumas DO      Patient Name: Emanuel Tamayo  Admit Date: 3/4/2025  8:07 AM  MR #: [id number]  : 1985    Attending Physician: Bernardino Dumas DO    History of Presenting Illness:      Emanuel Tamayo is a 40 y.o. female on hospital day 0, admitted with hematemesis. Patient with  has no past medical history on file.    Reason for GI consult: Hematemesis    History Obtained From:  patient, electronic medical record    Patient presented with one episode of nausea and vomiting. She described emesis of food followed by bright red blood.  She has frequent episodes of acid reflux occurring 3 to 4 times a week followed by 1 to 2 minutes of sharp epigastric pain.  She states this is resolved by taking Tums OTC.  Patient has history of significant Etoh use.  She reports 5 to 6 days a week having 8 to 9 shots of liquor a day. Patient reports she is 10 weeks pregnant. She does endorse appointment scheduled in 3 days to terminate pregnancy.  She denies any episodes of nausea nor vomiting prior to today.   Previous endoscopic evaluation:  No previous endoscopic procedures, no reports available in Care Everywhere    History:    No past medical history on file.  No past surgical history on file.  Family History  No family history on file.  [] Unable to obtain due to ventilated and/ or neurologic status  Social History     Socioeconomic History    Marital status: Single     Spouse name: Not on file    Number of children: Not on file    Years of education: Not on file    Highest education level: Not on file   Occupational History    Not on file   Tobacco Use    Smoking status: Never    Smokeless tobacco: Never   Substance and Sexual Activity    Alcohol use: Never    Drug use: Never    Sexual activity: Not on file   Other Topics Concern    Not on file   Social History Narrative    Not on file     Social Determinants of Health      Financial Resource Strain: Not on file   Food Insecurity: Patient Declined (3/4/2025)    Hunger Vital Sign     Worried About Running Out of Food in the Last Year: Patient declined     Ran Out of Food in the Last Year: Patient declined   Transportation Needs: Patient Declined (3/4/2025)    PRAPARE - Transportation     Lack of Transportation (Medical): Patient declined     Lack of Transportation (Non-Medical): Patient declined   Physical Activity: Not on file   Stress: Not on file   Social Connections: Not on file   Intimate Partner Violence: Not on file   Housing Stability: Patient Declined (3/4/2025)    Housing Stability Vital Sign     Unable to Pay for Housing in the Last Year: Patient declined     Number of Times Moved in the Last Year: 0     Homeless in the Last Year: Patient declined      [] Unable to obtain due to ventilated and/ or neurologic status    Home Medications:   Medications Prior to Admission: Prenat w/o A-FeCbn-DSS-FA-DHA (CITRANATAL HARMONY) 28-1-250 MG CAPS, Take 1 tablet by mouth daily. (Patient not taking: Reported on 3/4/2025)    Current Hospital Medications:   Scheduled Meds:   pantoprazole (PROTONIX) 40 mg in sodium chloride (PF) 0.9 % 10 mL injection  40 mg IntraVENous Q12H    sodium chloride flush  5-40 mL IntraVENous 2 times per day    sodium chloride flush  5-40 mL IntraVENous 2 times per day     Continuous Infusions:   sodium chloride      sodium chloride 125 mL/hr at 03/04/25 1133    sodium chloride       PRN Meds:.sodium chloride flush, sodium chloride, ondansetron **OR** ondansetron, acetaminophen **OR** acetaminophen, sodium chloride flush, sodium chloride, LORazepam **OR** LORazepam **OR** LORazepam **OR** LORazepam **OR** LORazepam **OR** LORazepam **OR** LORazepam **OR** LORazepam   sodium chloride      sodium chloride 125 mL/hr at 03/04/25 1133    sodium chloride          Allergies:     Allergies   Allergen Reactions    Pcn [Penicillins]         Review of Systems:    [x] CV,

## 2025-03-04 NOTE — ED TRIAGE NOTES
Pt presents to ED via self through lobby.  C/o: vomiting blood since this morning.  Pt states she has been experiencing immense stress lately.  Pt denies pertinent hx/medications.  Never happened before.  Denies pain.

## 2025-03-04 NOTE — ED PROVIDER NOTES
[facility]  4W MED SURG UNIT  eMERGENCY dEPARTMENT eNCOUnter      Pt Name: Emanuel Tamayo  MRN: [id number]  Birthdate 1985  Date of evaluation: 3/4/2025  Provider: Enio Sims MD  Note Started: 3/4/25 8:04 AM EST    HISTORY OF PRESENT ILLNESS      Chief Complaint   Patient presents with    Hematemesis       The history is provided by the Patient.  Emanuel Tamayo is a 40 y.o.  female at 10wks GA by [facility] with a PMH clinically significant for Tobacco Use, Etoh Use, and THC Use presenting to the ED via Self c/o episode of large volume hematemesis occurring this morning with nausea and vomiting waking her up at 5AM.  States she did have normal stomach content in the vomitus as well, but had approximately about 1 pint of blood which she stated was bright red.  Did not have any accompanying abdominal pain.  States this is never happened in the past.  Does not follow-up with a GI regularly.  Does admit to heavy alcohol use, drinking about 5 times per week with 5-7 drinks each night.  Also smokes marijuana.  Denies any other illicit substance abuse.  Does not feel that nauseous currently.  Currently denying any symptoms, but was very concerned with the volume of the vomit.  States she is currently pregnant, but has an appointment later this week to schedule an .  Believes she is about 10 weeks prior last gestational age.  Only found out she was pregnant this weekend.  States she does have a history of irregular menses in the past. Has not had frequent n/v over the past few days.  Denies any associated: F/C/D, HA, Lightheadedness, Dizziness, Cough, Hemoptysis, SOB, CP, New or worsening BLE Edema/pain, Abd pain, Diarrhea, Constipation, Melena, Hematochezia, Flank pain, Dysuria, Hematuria, Difficulty urinating, Vaginal Discharge, or Vaginal Bleeding.  Denies preceding Falls, Trauma, Exertional or strenuous activities, or Abnormal PO intake. nahpihx: States no history of similar prior episodes States they have  Medicine Attending Physician    (Please note that portions of this note were completed with a voice recognition program.  Efforts were made to edit the dictations but occasionally words are mis-transcribed.)     Eino Sims MD  03/04/25 2012

## 2025-03-05 VITALS
TEMPERATURE: 98.2 F | OXYGEN SATURATION: 97 % | WEIGHT: 164.9 LBS | SYSTOLIC BLOOD PRESSURE: 100 MMHG | BODY MASS INDEX: 27.47 KG/M2 | HEART RATE: 75 BPM | RESPIRATION RATE: 16 BRPM | DIASTOLIC BLOOD PRESSURE: 73 MMHG | HEIGHT: 65 IN

## 2025-03-05 LAB
EKG ATRIAL RATE: 93 BPM
EKG P AXIS: 66 DEGREES
EKG P-R INTERVAL: 170 MS
EKG Q-T INTERVAL: 350 MS
EKG QRS DURATION: 82 MS
EKG QTC CALCULATION (BAZETT): 435 MS
EKG R AXIS: 49 DEGREES
EKG T AXIS: 51 DEGREES
EKG VENTRICULAR RATE: 93 BPM
ERYTHROCYTE [DISTWIDTH] IN BLOOD BY AUTOMATED COUNT: 14.3 % (ref 11.5–14.5)
HCT VFR BLD AUTO: 33.2 % (ref 37–47)
HGB BLD-MCNC: 11.4 G/DL (ref 12–16)
MCH RBC QN AUTO: 32.1 PG (ref 27–31.3)
MCHC RBC AUTO-ENTMCNC: 34.3 % (ref 33–37)
MCV RBC AUTO: 93.5 FL (ref 79.4–94.8)
PLATELET # BLD AUTO: 325 K/UL (ref 130–400)
RBC # BLD AUTO: 3.55 M/UL (ref 4.2–5.4)
WBC # BLD AUTO: 8.2 K/UL (ref 4.8–10.8)

## 2025-03-05 PROCEDURE — 2580000003 HC RX 258: Performed by: INTERNAL MEDICINE

## 2025-03-05 PROCEDURE — 85027 COMPLETE CBC AUTOMATED: CPT

## 2025-03-05 PROCEDURE — 96376 TX/PRO/DX INJ SAME DRUG ADON: CPT

## 2025-03-05 PROCEDURE — 36415 COLL VENOUS BLD VENIPUNCTURE: CPT

## 2025-03-05 PROCEDURE — 6360000002 HC RX W HCPCS: Performed by: INTERNAL MEDICINE

## 2025-03-05 PROCEDURE — G0378 HOSPITAL OBSERVATION PER HR: HCPCS

## 2025-03-05 PROCEDURE — 99231 SBSQ HOSP IP/OBS SF/LOW 25: CPT | Performed by: INTERNAL MEDICINE

## 2025-03-05 PROCEDURE — 2500000003 HC RX 250 WO HCPCS: Performed by: INTERNAL MEDICINE

## 2025-03-05 PROCEDURE — 6370000000 HC RX 637 (ALT 250 FOR IP): Performed by: INTERNAL MEDICINE

## 2025-03-05 RX ORDER — PANTOPRAZOLE SODIUM 40 MG/1
40 TABLET, DELAYED RELEASE ORAL
Status: DISCONTINUED | OUTPATIENT
Start: 2025-03-06 | End: 2025-03-05 | Stop reason: HOSPADM

## 2025-03-05 RX ORDER — NALTREXONE HYDROCHLORIDE 50 MG/1
50 TABLET, FILM COATED ORAL DAILY
Qty: 30 TABLET | Refills: 1 | Status: SHIPPED | OUTPATIENT
Start: 2025-03-05

## 2025-03-05 RX ORDER — PANTOPRAZOLE SODIUM 40 MG/1
40 TABLET, DELAYED RELEASE ORAL
Qty: 30 TABLET | Refills: 1 | Status: SHIPPED | OUTPATIENT
Start: 2025-03-06

## 2025-03-05 RX ADMIN — SODIUM CHLORIDE, PRESERVATIVE FREE 10 ML: 5 INJECTION INTRAVENOUS at 08:23

## 2025-03-05 RX ADMIN — ONDANSETRON 4 MG: 4 TABLET, ORALLY DISINTEGRATING ORAL at 08:22

## 2025-03-05 RX ADMIN — SODIUM CHLORIDE 40 MG: 9 INJECTION, SOLUTION INTRAMUSCULAR; INTRAVENOUS; SUBCUTANEOUS at 08:23

## 2025-03-05 NOTE — PROGRESS NOTES
Shift assessment complete. Pt is AxOx4. Meds given per mar. Independent in the room. Denies nausea and vomiting. Tolerating clear liquid diet. Call light within reach.     Electronically signed by Kerry Boyer RN on 3/5/2025 at 8:39 AM

## 2025-03-05 NOTE — PLAN OF CARE
Problem: Discharge Planning  Goal: Discharge to home or other facility with appropriate resources  3/5/2025 0837 by Kerry Boyer RN  Outcome: Progressing  3/4/2025 2339 by Jannet Harrell RN  Outcome: Progressing     Problem: ABCDS Injury Assessment  Goal: Absence of physical injury  3/5/2025 0837 by Kerry Boyer RN  Outcome: Progressing  3/4/2025 2339 by Jannet Harrell RN  Outcome: Progressing

## 2025-03-05 NOTE — PROGRESS NOTES
IV removed without complication. Pt tolerated well. Catheter intact, dressing applied. No drainage noted.     Discharge instructions provided to patient. Verbalized understanding of follow up appointments, medications and reasons to return to ED/call physician. All questions answered. Copy of discharge instructions provided. Pt refused wheelchair and ambulated off the unit with her significant other.    Electronically signed by Kerry Boyer RN on 3/5/2025 at 12:50 PM

## 2025-03-05 NOTE — DISCHARGE SUMMARY
[facility] Hospital Medicine Discharge Summary    Emanuel Tamayo  :  1985  MRN:  [id number]    Admit date:  3/4/2025  Discharge date:  3/5/2025    Admitting Physician:  Bernardino Dumas DO  Primary Care Physician:  None, . (Inactive)    Discharge Diagnoses:    Principal Problem:    Hematemesis  Resolved Problems:    * No resolved hospital problems. *    Chief Complaint   Patient presents with    Hematemesis       Condition: improved   Activity: no restrictions  Diet: regular  Disposition: home  Functional Status: ambulatory    Significant Findings:     Recent Labs     25  0633 25  1600 25  0841 25  1802   WBC 8.2  --  16.0* 12.5*   HGB 11.4* 11.6* 13.2 14.1   HCT 33.2* 33.5* 37.3 39.0   MCV 93.5  --  93.3 93.5     --  401* 402*         Hospital Course:   40-year-old female with alcohol use disorder presented with hematemesis.  She was monitored in the hospital and did not have any further bleeding.  Because she was pregnant, GI team wanted to avoid EGD unless it was necessary.  The patient does have plan for procedure to terminate pregnancy this Friday 3/7.  She will continue on PPI after discharge.    She is interested in treatment options for alcohol use disorder.  Social work provided resources and she will get a new PCP after discharge.  She was given naltrexone for treatment of alcohol use disorder which she will start 3 days after her procedure.      Exam on Discharge:   /73   Pulse 75   Temp 98.2 °F (36.8 °C) (Oral)   Resp 16   Ht 1.651 m (5' 5\")   Wt 74.8 kg (164 lb 14.4 oz)   SpO2 97%   BMI 27.44 kg/m²   General appearance: alert, cooperative and no distress  Mental Status: oriented to person, place and time and normal affect  Lungs: clear to auscultation bilaterally, normal effort  Heart: regular rate and rhythm, no murmur  Abdomen: soft, nontender, nondistended, bowel sounds present, no masses  Extremities: no edema, redness, tenderness in

## 2025-03-05 NOTE — PROGRESS NOTES
Gastroenterology Progress Note    Emanuel Tamayo is a 40 y.o. female patient.  Hospitalization Day:1    Chief C/O: Hematemesis    SUBJECTIVE: Patient tolerating liquid diet without difficulty.  Patient reports she is hungry and would like to have more food.  No episodes of emesis overnight.  Patient's hemoglobin 11.4.  Patient denies any nausea, vomiting or abdominal pain    ROS:  Gastrointestinal ROS: no abdominal pain, change in bowel habits, or black or bloody stools    Physical    VITALS:  /73   Pulse 75   Temp 98.2 °F (36.8 °C) (Oral)   Resp 16   Ht 1.651 m (5' 5\")   Wt 74.8 kg (164 lb 14.4 oz)   SpO2 97%   BMI 27.44 kg/m²   TEMPERATURE:  Current - Temp: 98.2 °F (36.8 °C); Max - Temp  Av.9 °F (36.6 °C)  Min: 97.5 °F (36.4 °C)  Max: 98.2 °F (36.8 °C)    General -no acute distress  Eyes - no Icterus, no pallor  Cardiovascular - RRR, no murmur  Lungs -clear to auscultation bilaterally  Abdomen - non-distended,  non-tender, no organomegaly, Bowel sounds normal  Extremities - no edema  Skin -warm and dry  Neuro: no asterixis     Data    Data Review:    Recent Labs     25  0841 25  1600 25  0633   WBC 16.0*  --  8.2   HGB 13.2 11.6* 11.4*   HCT 37.3 33.5* 33.2*   MCV 93.3  --  93.5   *  --  325     Recent Labs     25  0841      K 4.1      CO2 25   BUN 13   CREATININE 0.69     Recent Labs     25  0841   AST 12   ALT 11   BILITOT <0.2   ALKPHOS 60     Recent Labs     25  0841   LIPASE 44     Recent Labs     25  0841   PROTIME 12.1*   INR 0.9       Radiology Review:  XR CHEST PORTABLE  Narrative: EXAMINATION:  ONE XRAY VIEW OF THE CHEST  3/4/2025 8:40 am    FINDINGS:  No consolidation.  No pleural effusion.  No pneumothorax.  Normal  cardiomediastinal silhouette.  No acute osseous findings.  Impression: No acute radiographic abnormality.     ASSESSMENT:  40 y.o. female admitted with hematemesis, GI consulted for hematemesis. Patient had  episode of nausea and vomiting this am with food, followed by bright red blood.  Patient has significant history of Etoh use.  She is currently pregnant.  Patient has had no further hematemesis since admission.  Patient's intermittent epigastric pain and esophageal burning likely GERD related and should be maintained on daily PPI after discharge.  Differential diagnosis for hematemesis likely Patito-Damon tear with no further nausea, vomiting nor hematemesis since admission.  Also on differential alcoholic gastropathy versus gastritis with significant EtOH history.    3/5/2025:  Patient's hemoglobin stable at 11.4 today with no overt GI blood loss overnight. Patient denies any acid reflux on PPI, discussion to continue medication after discharge.     PLAN :  -Switch to oral PPI daily, continue as outpatient.   -Manage nausea and vomiting for continued healing with antiemetic  -Advance diet as tolerated  -Follow up in GI clinic outpatient in 2 weeks.     Thank you for allowing me to participate in the care of your patient.  Please feel free to contact me with any concerns.    Thom Michelle MD

## 2025-03-05 NOTE — CARE COORDINATION
This LSW completed social work consult with patient at bedside today. Patient adamantly refused [employer] and/or any treatment for alcohol misuse.  PCP list provided.  Electronically signed by VIVI Plata on 3/5/25 at 1:59 PM EST

## 2025-03-27 ENCOUNTER — TELEPHONE (OUTPATIENT)
Age: 40
End: 2025-03-27